# Patient Record
Sex: FEMALE | Race: WHITE | NOT HISPANIC OR LATINO | URBAN - METROPOLITAN AREA
[De-identification: names, ages, dates, MRNs, and addresses within clinical notes are randomized per-mention and may not be internally consistent; named-entity substitution may affect disease eponyms.]

---

## 2017-04-24 ENCOUNTER — TRANSFERRED RECORDS (OUTPATIENT)
Dept: HEALTH INFORMATION MANAGEMENT | Facility: CLINIC | Age: 57
End: 2017-04-24

## 2017-04-26 ENCOUNTER — OFFICE VISIT (OUTPATIENT)
Dept: FAMILY MEDICINE | Facility: CLINIC | Age: 57
End: 2017-04-26

## 2017-04-26 VITALS
DIASTOLIC BLOOD PRESSURE: 90 MMHG | SYSTOLIC BLOOD PRESSURE: 132 MMHG | HEART RATE: 67 BPM | HEIGHT: 67 IN | WEIGHT: 229 LBS | OXYGEN SATURATION: 98 % | TEMPERATURE: 98.7 F | BODY MASS INDEX: 35.94 KG/M2

## 2017-04-26 DIAGNOSIS — T78.40XD ALLERGIC REACTION, SUBSEQUENT ENCOUNTER: Primary | ICD-10-CM

## 2017-04-26 DIAGNOSIS — R03.0 ELEVATED BLOOD PRESSURE READING WITHOUT DIAGNOSIS OF HYPERTENSION: ICD-10-CM

## 2017-04-26 DIAGNOSIS — R06.02 SOB (SHORTNESS OF BREATH): ICD-10-CM

## 2017-04-26 RX ORDER — METHYLPREDNISOLONE 4 MG
TABLET, DOSE PACK ORAL
COMMUNITY
Start: 2017-04-24 | End: 2017-05-01

## 2017-04-26 RX ORDER — ALBUTEROL SULFATE 90 UG/1
2 AEROSOL, METERED RESPIRATORY (INHALATION) EVERY 6 HOURS PRN
Qty: 3 INHALER | Refills: 1 | Status: SHIPPED | OUTPATIENT
Start: 2017-04-26

## 2017-04-26 NOTE — PATIENT INSTRUCTIONS
Use albuterol as needed for shortness of breath. If not helping, please call our clinic and let us know.    Schedule allergy visit.    Return to clinic in 2 weeks for recheck of blood pressure.    You are scheduled for Allergy/Asthma referral at:  [facility]  [address] [place] [id number]  [phone]  Date:  Tuesday May 2, 2017  Time:  10:20 AM  Given to patient.  Sarah Romero  4/26/17

## 2017-04-26 NOTE — PROGRESS NOTES
"Subjective:    Jina Hu is a 56 year old female who presents for follow up on ED visit at [facility] 4/24/17 for presumed allergic reaction. She reports that for the last couple of years she has experienced periodic episodes of \"scratchy throat\" and \"cough with clearing of my throat\". She has attributed these symptoms to artificial sweeteners in the past. She would use benadryl when these symptoms would occur and it would relieve her symptoms. After cutting out artifical sweeteners, she was symptom free for quite a while until about three weeks ago when she drank some apple cider vinegar and experience similar symptoms, which were again relived with benadryl. However, she tells me she went to a baby shower on 4/23/17 and had a couple of cupcakes and fountain fruit punch and had return of the same symptoms again. She took a couple of doses of benadryl and it did not go away. She went to bed, but her symptoms woke her up at 11:30 with scratchy throat and coughing and she took another dose of benadryl but it did not go away completely so she went to the ED for evalution. She was also having some chest heaviness across her entire chest with some SOB, which was new for her, so this made her nervous.     At [place]'s ED, they did an EKG, troponin, d-dimer, CXR and BMP, all of which were unremarkable. Her BP was elevated to 192/90's, but was otherwise vitally stable. They gave her a dose a decadron and sent her home with methylprednisolone 4 mg daily X 5 days (she is day 3/5 now and tolerating well). Her symptoms resolved early on 4/24/17 and have not recurred. She says she had asthma many years ago, but it \"went away\". She denies having chest pain or SOB ever before and denies any exertional chest pressure or history of heart disease. She denies any history of high blood pressure and denies tobacco abuse. No fever, chills or productive cough.    ROS:   General: Denies fevers, chills.  Skin: Denies new rashes or " "lesions.  HEENT: Denies headache, visual disturbance.   CV: Denies chest pain, palpitations or shortness of breath.  GI: Denies N/V/D.    Objective:    /90  Pulse 67  Temp 98.7  F (37.1  C)  Ht 5' 6.75\" (169.5 cm)  Wt 229 lb (103.9 kg)  SpO2 98%  BMI 36.14 kg/m2    Physical Exam:  General: NAD.  Skin: No rashes or lesions visualized.  HEENT: PERRLA, EOMI.  Heart: Regular rhythm, no murmurs.  Lungs: Clear to auscultation b/l, no wheezes, crackles, or rales.  Abdomen: Bowel sounds X 4 quadrants, no tenderness to palpation.   MSK: Normal gait.  Lymphatic: No swelling seen.  Neurologic: CN II-XII grossly intact.     Assessment/Plan:  1. Allergic reaction: Symptoms consistent with allergic reaction, but unclear impetus. Referral for allergy testing placed. Continue medrol dose pack to completion. Call if worsening or return of symptoms.      2. Shortness of breath, chest pressure: Likely related to allergic reaction, but possible that there is a component of reactive lung disease to this, with reported history of asthma. Prescribed albuterol inhaler as needed for wheezing. This does not sound anginal in nature, but will continue to monitor. Patient advised of signs and symptoms warranting evaluation in the ED.     3. Elevated blood pressure without diagnosis of hypertension: Patient not interested in home blood pressure cuff. Will have her return in 2 weeks for recheck.      Angelita Day, PGY 2  Family Medicine Resident  [facility]             "

## 2017-04-26 NOTE — MR AVS SNAPSHOT
After Visit Summary   4/26/2017    Jina Hu    MRN: [id number]           Patient Information     Date Of Birth          1960        Visit Information        Provider Department      4/26/2017 1:30 PM Angelita Day DO [facility]        Today's Diagnoses     SOB (shortness of breath)    -  1    Allergic reaction, subsequent encounter          Care Instructions    Use albuterol as needed for shortness of breath. If not helping, please call our clinic and let us know.    Schedule allergy visit.    Return to clinic in 2 weeks for recheck of blood pressure.        Follow-ups after your visit        Additional Services     ALLERGY/ASTHMA ADULT REFERRAL       Patient will either stop [facility] desk by or call.    Reason for Referral: History of possible allergic reactions, would like to be tested for allergies.     needed: No  Language: English                  Who to contact     Please call your clinic at [phone] to:    Ask questions about your health    Make or cancel appointments    Discuss your medicines    Learn about your test results    Speak to your doctor   If you have compliments or concerns about an experience at your clinic, or if you wish to file a complaint, please contact [facility] Patient Relations at [phone] or email us at [email or web address]         Additional Information About Your Visit        MyChart Information     Mingleboxt is an electronic gateway that provides easy, online access to your medical records. With Gaopeng, you can request a clinic appointment, read your test results, renew a prescription or communicate with your care team.     To sign up for Mingleboxt visit the website at [email or web address]   You will be asked to enter the access code listed below, as well as some personal information. Please follow the directions to create your username and password.     Your access code is:  [email or web address]  Expires: 2017  2:24 PM     Your access code will  in 90 days. If you need help or a new code, please contact your [facility] or call [phone] for assistance.        Care EveryWhere ID     This is your Care EveryWhere ID. This could be used by other organizations to access your [place] medical records  [id number]        Your Vitals Were     Pulse Temperature Height Pulse Oximetry BMI (Body Mass Index)       67 98.7  F (37.1  C) 5' 6.75\" (169.5 cm) 98% 36.14 kg/m2        Blood Pressure from Last 3 Encounters:   17 132/90   06/03/15 113/76   03/11/15 108/75    Weight from Last 3 Encounters:   17 229 lb (103.9 kg)   06/03/15 226 lb (102.5 kg)   03/11/15 218 lb 4 oz (99 kg)              We Performed the Following     ALLERGY/ASTHMA ADULT REFERRAL          Today's Medication Changes          These changes are accurate as of: 17  2:24 PM.  If you have any questions, ask your nurse or doctor.               Start taking these medicines.        Dose/Directions    albuterol 108 (90 BASE) MCG/ACT Inhaler   Commonly known as:  PROAIR HFA/PROVENTIL HFA/VENTOLIN HFA   Used for:  SOB (shortness of breath)   Started by:  Angelita Day DO        Dose:  2 puff   Inhale 2 puffs into the lungs every 6 hours as needed for shortness of breath / dyspnea or wheezing   Quantity:  3 Inhaler   Refills:  1            Where to get your medicines      These medications were sent to [id number] - [place], [place] - [address] AT [facility] [address]  [address], [place] [address]     Phone:  [phone]     albuterol 108 (90 BASE) MCG/ACT Inhaler                Primary Care Provider Office Phone # Fax #    Eliza Guerita Bradshaw [phone]       [facility] [address] [place] [address]        Thank you!     Thank you for choosing [facility]  for your care. Our goal is " always to provide you with excellent care. Hearing back from our patients is one way we can continue to improve our services. Please take a few minutes to complete the written survey that you may receive in the mail after your visit with us. Thank you!             Your Updated Medication List - Protect others around you: Learn how to safely use, store and throw away your medicines at www.disposemymeds.org.          This list is accurate as of: 4/26/17  2:24 PM.  Always use your most recent med list.                   Brand Name Dispense Instructions for use    albuterol 108 (90 BASE) MCG/ACT Inhaler    PROAIR HFA/PROVENTIL HFA/VENTOLIN HFA    3 Inhaler    Inhale 2 puffs into the lungs every 6 hours as needed for shortness of breath / dyspnea or wheezing       ALEVE PO      Take as needed       methylPREDNISolone 4 MG tablet    MEDROL DOSEPAK     follow package directions       TYLENOL PO      Per pt infoTake as needed

## 2017-05-02 ENCOUNTER — OFFICE VISIT - HEALTHEAST (OUTPATIENT)
Dept: ALLERGY | Facility: CLINIC | Age: 57
End: 2017-05-02

## 2017-05-02 DIAGNOSIS — K21.9 GASTROESOPHAGEAL REFLUX DISEASE, ESOPHAGITIS PRESENCE NOT SPECIFIED: ICD-10-CM

## 2018-01-29 ENCOUNTER — TELEPHONE (OUTPATIENT)
Dept: FAMILY MEDICINE | Facility: CLINIC | Age: 58
End: 2018-01-29

## 2018-01-29 NOTE — TELEPHONE ENCOUNTER
May 2015.. Twitch on right eye and blew it off as stress related and have been dealing with it but on Sunday she started having pain in her eye and states that the pain feel as though it is behind her eye. States the pain is consistent and remains the same. She denies any vision changes since it started but she is concerned. Patient was advised to make an appointment in clinic or with her eye doctor but she should get to see someone ASAP. Patient verbalized understanding and will check with the eye clinic to see if they have any available appointments. Patient symptoms were discussed in clinic with Dr. Maher and she agrees the patient needs to get in to be seen. /JM Law  Routed to Dr. Maher

## 2018-01-29 NOTE — TELEPHONE ENCOUNTER
[facility] phone call message-patient reporting a symptom:     Symptom:     Right eye pain    Same Day Visit Offered: n/a    Additional comments:    Please advise.    OK to leave message on voice mail? Yes    Primary language: English      needed? No    Call taken on January 29, 2018 at 11:52 AM by Sarah Romero

## 2018-02-15 ENCOUNTER — TRANSFERRED RECORDS (OUTPATIENT)
Dept: HEALTH INFORMATION MANAGEMENT | Facility: CLINIC | Age: 58
End: 2018-02-15

## 2018-02-22 ENCOUNTER — HOSPITAL ENCOUNTER (OUTPATIENT)
Dept: MAMMOGRAPHY | Facility: CLINIC | Age: 58
Discharge: HOME | End: 2018-02-22
Attending: FAMILY MEDICINE

## 2018-02-22 ENCOUNTER — COMMUNICATION - HEALTHEAST (OUTPATIENT)
Dept: TELEHEALTH | Facility: CLINIC | Age: 58
End: 2018-02-22

## 2018-02-22 DIAGNOSIS — Z12.31 VISIT FOR SCREENING MAMMOGRAM: ICD-10-CM

## 2018-03-01 DIAGNOSIS — Z12.31 VISIT FOR SCREENING MAMMOGRAM: Primary | ICD-10-CM

## 2018-03-01 LAB — MAMMOGRAM: NORMAL

## 2018-03-31 ENCOUNTER — HEALTH MAINTENANCE LETTER (OUTPATIENT)
Age: 58
End: 2018-03-31

## 2018-10-11 ENCOUNTER — OFFICE VISIT (OUTPATIENT)
Dept: FAMILY MEDICINE | Facility: CLINIC | Age: 58
End: 2018-10-11
Payer: COMMERCIAL

## 2018-10-11 ENCOUNTER — AMBULATORY - HEALTHEAST (OUTPATIENT)
Dept: ADMINISTRATIVE | Facility: REHABILITATION | Age: 58
End: 2018-10-11

## 2018-10-11 VITALS
RESPIRATION RATE: 16 BRPM | DIASTOLIC BLOOD PRESSURE: 82 MMHG | BODY MASS INDEX: 39.23 KG/M2 | TEMPERATURE: 97.9 F | HEART RATE: 88 BPM | SYSTOLIC BLOOD PRESSURE: 117 MMHG | WEIGHT: 248.6 LBS | OXYGEN SATURATION: 96 %

## 2018-10-11 DIAGNOSIS — M25.50 MULTIPLE JOINT PAIN: ICD-10-CM

## 2018-10-11 DIAGNOSIS — M19.90 ARTHRITIS: ICD-10-CM

## 2018-10-11 DIAGNOSIS — Z51.81 ENCOUNTER FOR MONITORING CHRONIC NSAID THERAPY: ICD-10-CM

## 2018-10-11 DIAGNOSIS — Z79.1 ENCOUNTER FOR MONITORING CHRONIC NSAID THERAPY: ICD-10-CM

## 2018-10-11 DIAGNOSIS — M19.90 ARTHRITIS: Primary | ICD-10-CM

## 2018-10-11 LAB
BUN SERPL-MCNC: 15.5 MG/DL (ref 7–19)
CALCIUM SERPL-MCNC: 9 MG/DL (ref 8.5–10.1)
CHLORIDE SERPLBLD-SCNC: 103.4 MMOL/L (ref 98–110)
CO2 SERPL-SCNC: 25 MMOL/L (ref 20–32)
CREAT SERPL-MCNC: 0.8 MG/DL (ref 0.5–1)
ERYTHROCYTE [SEDIMENTATION RATE] IN BLOOD: 58 MM/HR (ref 0–20)
GFR SERPL CREATININE-BSD FRML MDRD: 78 ML/MIN/1.7 M2
GLUCOSE SERPL-MCNC: 120.6 MG'DL (ref 70–99)
POTASSIUM SERPL-SCNC: 4 MMOL/DL (ref 3.2–4.6)
SODIUM SERPL-SCNC: 134.9 MMOL/L (ref 132–142)

## 2018-10-11 RX ORDER — ACETAMINOPHEN 500 MG
1000 TABLET ORAL EVERY 8 HOURS PRN
Qty: 90 TABLET | Refills: 1 | Status: SHIPPED | OUTPATIENT
Start: 2018-10-11

## 2018-10-11 RX ORDER — NAPROXEN 500 MG/1
500 TABLET ORAL 2 TIMES DAILY PRN
Qty: 90 TABLET | Refills: 1 | Status: SHIPPED | OUTPATIENT
Start: 2018-10-11

## 2018-10-11 NOTE — MR AVS SNAPSHOT
After Visit Summary   10/11/2018    Jina Hu    MRN: [id number]           Patient Information     Date Of Birth          1960        Visit Information        Provider Department      10/11/2018 8:40 AM Eliza Bradshaw MD [facility]        Today's Diagnoses     Arthritis    -  1    Encounter for monitoring chronic NSAID therapy        Multiple joint pain          Care Instructions    10/11/2018  Jina Hu    It was a pleasure to see you today at [facility].     My recommendations after this visit include:   1. Aleve 500 mg twice daily  2. Tylenol 1000 mg every 8 hours as needed for pain  3. Follow up with Orthopedic surgeon for hip pain  4. Labs for monitoring kidney function and inflammation  5. PT; please schedule    Thank you for allowing me to be a part of your health care team!    Sincerely,   Dr. Bradshaw            Follow-ups after your visit        Additional Services     PHYSICAL THERAPY REFERRAL       PT/OT REFERRAL  Jina Hu  1960  Phone #: [phone] (home)    needed: No  Language: English    PT/OT  Facility:   [facility], P: [phone], F: [phone]    History: hip, knee, wrist and hand pain.     Precautions/Contraindications: None    Imaging Studies: None    Surgical Procedure/Test Results: None    Treatment Goals:   Increase: Flexibility, Strength and ROM  Decrease: Pain    Prognosis: good    Visits: Up to 12    Evaluate: Evaluate and treat    Plan: Manual Therapy, Flexibility Exercise and Strength Exercise    Use of Ionto/Phonophoresis Approved - patient requires a separate medication prescription. Dexamethasone 4 mg/ml injectable - 10cc                  Future tests that were ordered for you today     Open Future Orders        Priority Expected Expires Ordered    PHYSICAL THERAPY REFERRAL Routine  10/11/2019 10/11/2018            Who to contact     Please call your clinic at [phone] to:    Ask  questions about your health    Make or cancel appointments    Discuss your medicines    Learn about your test results    Speak to your doctor            Additional Information About Your Visit        SplashMapsharLending a Helping Hand Information     ChatStat is an electronic gateway that provides easy, online access to your medical records. With ChatStat, you can request a clinic appointment, read your test results, renew a prescription or communicate with your care team.     To sign up for ChatStat visit the website at [email or web address]   You will be asked to enter the access code listed below, as well as some personal information. Please follow the directions to create your username and password.     Your access code is: [email or web address]  Expires: 2019  9:18 AM     Your access code will  in 90 days. If you need help or a new code, please contact your [facility] or call [phone] for assistance.        Care EveryWhere ID     This is your Care EveryWhere ID. This could be used by other organizations to access your [place] medical records  [id number]        Your Vitals Were     Pulse Temperature Respirations Pulse Oximetry BMI (Body Mass Index)       88 97.9  F (36.6  C) (Oral) 16 96% 39.23 kg/m2        Blood Pressure from Last 3 Encounters:   10/11/18 117/82   17 132/90   06/03/15 113/76    Weight from Last 3 Encounters:   10/11/18 248 lb 9.6 oz (112.8 kg)   17 229 lb (103.9 kg)   06/03/15 226 lb (102.5 kg)              We Performed the Following     Basic Metabolic Panel ([place])     Erythrocyte Sed Rate ([facility])          Today's Medication Changes          These changes are accurate as of 10/11/18  9:18 AM.  If you have any questions, ask your nurse or doctor.               Start taking these medicines.        Dose/Directions    naproxen 500 MG tablet   Commonly known as:  NAPROSYN   Used for:  Arthritis   Started by:  Eliza Bradshaw MD        Dose:  500 mg   Take 1  tablet (500 mg) by mouth 2 times daily as needed for moderate pain   Quantity:  90 tablet   Refills:  1         These medicines have changed or have updated prescriptions.        Dose/Directions    * TYLENOL PO   This may have changed:  Another medication with the same name was added. Make sure you understand how and when to take each.   Changed by:  Eliza Bradshaw MD        Per pt infoTake as needed   Refills:  0       * acetaminophen 500 MG tablet   Commonly known as:  TYLENOL   This may have changed:  You were already taking a medication with the same name, and this prescription was added. Make sure you understand how and when to take each.   Used for:  Arthritis   Changed by:  Eliza Bradshaw MD        Dose:  1000 mg   Take 2 tablets (1,000 mg) by mouth every 8 hours as needed for mild pain   Quantity:  90 tablet   Refills:  1       * Notice:  This list has 2 medication(s) that are the same as other medications prescribed for you. Read the directions carefully, and ask your doctor or other care provider to review them with you.         Where to get your medicines      These medications were sent to [id number] - [place], [place] - [address] AT [facility] [address]  [facility] [address], [place] [address]     Phone:  [phone]     acetaminophen 500 MG tablet    naproxen 500 MG tablet                Primary Care Provider Office Phone # Fax #    Eliza Bradshaw [phone]       [facility] [address] [place] [address]        Equal Access to Services     HEMA LEE AH: Hadii aad ku hadasho Soomaali, waaxda luqadaha, qaybta kaalmada adeegyada, marvin muir haykoki yeboah. So [facility] [phone].    ATENCIÓN: Si habla español, tiene a hammonds disposición servicios gratuitos de asistencia lingüística. Llame al [phone].    We comply with applicable federal civil rights laws and [place] laws. We do not  discriminate on the basis of race, color, national origin, age, disability, sex, sexual orientation, or gender identity.            Thank you!     Thank you for choosing [facility]  for your care. Our goal is always to provide you with excellent care. Hearing back from our patients is one way we can continue to improve our services. Please take a few minutes to complete the written survey that you may receive in the mail after your visit with us. Thank you!             Your Updated Medication List - Protect others around you: Learn how to safely use, store and throw away your medicines at www.disposemymeds.org.          This list is accurate as of 10/11/18  9:18 AM.  Always use your most recent med list.                   Brand Name Dispense Instructions for use Diagnosis    albuterol 108 (90 Base) MCG/ACT inhaler    PROAIR HFA/PROVENTIL HFA/VENTOLIN HFA    3 Inhaler    Inhale 2 puffs into the lungs every 6 hours as needed for shortness of breath / dyspnea or wheezing    SOB (shortness of breath)       ALEVE PO      Take as needed        naproxen 500 MG tablet    NAPROSYN    90 tablet    Take 1 tablet (500 mg) by mouth 2 times daily as needed for moderate pain    Arthritis       * TYLENOL PO      Per pt infoTake as needed        * acetaminophen 500 MG tablet    TYLENOL    90 tablet    Take 2 tablets (1,000 mg) by mouth every 8 hours as needed for mild pain    Arthritis       * Notice:  This list has 2 medication(s) that are the same as other medications prescribed for you. Read the directions carefully, and ask your doctor or other care provider to review them with you.
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
## 2018-10-11 NOTE — PATIENT INSTRUCTIONS
10/11/2018  Jina Hu    It was a pleasure to see you today at [facility].     My recommendations after this visit include:   1. Aleve 500 mg twice daily  2. Tylenol 1000 mg every 8 hours as needed for pain  3. Follow up with Orthopedic surgeon for hip pain  4. Labs for monitoring kidney function and inflammation  5. PT; please schedule    Thank you for allowing me to be a part of your health care team!    Sincerely,   Dr. Bradshaw    PHYSICAL THERAPY REFERRAL   October 11, 2018 at 9:33 am Demographics and referral for Physical Therapy faxed to [facility] at [phone] who will contact patient to schedule.  [facility]  Phone: [phone]  Fax: [phone]  Scheduling Hours: Monday - Friday, 7 am to 4:30 pm  [facility]

## 2018-10-11 NOTE — PROGRESS NOTES
Preceptor Attestation:   Patient seen, evaluated and discussed with the resident. I have verified the content of the note, which accurately reflects my assessment of the patient and the plan of care.   Supervising Physician:  Nakul Bray MD

## 2018-10-12 ENCOUNTER — TELEPHONE (OUTPATIENT)
Dept: FAMILY MEDICINE | Facility: CLINIC | Age: 58
End: 2018-10-12

## 2018-10-12 NOTE — TELEPHONE ENCOUNTER
[facility] phone call message- patient requesting results:    Test: Lab    Date of test: 10/11/2018    Additional Comments: the Pt called to ask about her results and would like a call back     OK to leave a message on voice mail? Yes    Primary language: English      needed? No    Call taken on October 12, 2018 at 2:34 PM by Elliot Godinez

## 2018-10-12 NOTE — PROGRESS NOTES
Attempted to call patient to discuss results. Left message on voicemail to schedule appointment to discuss results or call in to clinic to discuss. Elevate sed rate could be associated with inflammation which is causing multiple joint pains. Due to this I would like patient to come in to discuss further blood work and discuss what this may mean.   Eliza Bradshaw, DO  PGY3

## 2018-10-12 NOTE — TELEPHONE ENCOUNTER
Gave the following msg to the pt, then transferred call to appt line.    Notes Recorded by Eliza Bradshaw MD on 10/12/2018 at 2:31 PM  Attempted to call patient to discuss results. Left message on voicemail to schedule appointment to discuss results or call in to clinic to discuss. Elevate sed rate could be associated with inflammation which is causing multiple joint pains. Due to this I would like patient to come in to discuss further blood work and discuss what this may mean.   Eliza Bradshaw, DO  PGY3    /MJM Pak

## 2018-10-22 ENCOUNTER — OFFICE VISIT (OUTPATIENT)
Dept: FAMILY MEDICINE | Facility: CLINIC | Age: 58
End: 2018-10-22
Payer: COMMERCIAL

## 2018-10-22 VITALS
WEIGHT: 248 LBS | HEART RATE: 98 BPM | OXYGEN SATURATION: 95 % | DIASTOLIC BLOOD PRESSURE: 86 MMHG | RESPIRATION RATE: 20 BRPM | SYSTOLIC BLOOD PRESSURE: 120 MMHG | TEMPERATURE: 98.3 F | BODY MASS INDEX: 39.13 KG/M2

## 2018-10-22 DIAGNOSIS — R70.0 ELEVATED SED RATE: Primary | ICD-10-CM

## 2018-10-22 DIAGNOSIS — R73.9 HYPERGLYCEMIA: ICD-10-CM

## 2018-10-22 LAB
HBA1C MFR BLD: 5.9 % (ref 4.1–5.7)
RHEUMATOID FACT SERPL-ACNC: 113.3 IU/ML (ref 0–30)

## 2018-10-22 NOTE — PROGRESS NOTES
S: Jina Hu is a 57 year old female with a PMH of   Patient Active Problem List   Diagnosis     Hyperlipidemia     Personal history of tobacco use, presenting hazards to health     Ovarian cyst     Obesity     presenting to clinic today with a chief complaint of follow up for labs. She started CBD oil and this has been helpful for her pain. She reports improvement in pain in 15 minutes of starting the CBD oil. She is taking it once per day currently.   Patient believes this is RA based off experience with RA in others and family members. She states pain is 3/10. Usually would be 10/10.     ROS:  General: No fevers, chills  Head: No headache  Ears: No acute change in hearing.    CV: No chest pain or palpitations.  Resp: No shortness of breath.  No cough. No hemoptysis.  GI: No nausea, vomiting, constipation, diarrhea  : No urinary pains    Current Outpatient Prescriptions   Medication Sig Dispense Refill     Acetaminophen (TYLENOL PO) Per pt infoTake as needed       acetaminophen (TYLENOL) 500 MG tablet Take 2 tablets (1,000 mg) by mouth every 8 hours as needed for mild pain 90 tablet 1     Garlic 1000 MG CAPS        Lactobacillus (PROBIOTIC ACIDOPHILUS PO)        naproxen (NAPROSYN) 500 MG tablet Take 1 tablet (500 mg) by mouth 2 times daily as needed for moderate pain 90 tablet 1     Naproxen Sodium (ALEVE PO) Take as needed       UNABLE TO FIND MEDICATION NAME: CBD Oil       albuterol (PROAIR HFA/PROVENTIL HFA/VENTOLIN HFA) 108 (90 BASE) MCG/ACT Inhaler Inhale 2 puffs into the lungs every 6 hours as needed for shortness of breath / dyspnea or wheezing (Patient not taking: Reported on 10/11/2018) 3 Inhaler 1       O: /86  Pulse 98  Temp 98.3  F (36.8  C) (Oral)  Resp 20  Wt 248 lb (112.5 kg)  SpO2 95%  BMI 39.13 kg/m2   Gen:  Well nourished and in No acute distress   CV:  RRR  - no murmurs noted   Pulm:  CTAB, no wheezes or crackles noted, good air entry   Extrem: no cyanosis, edema or  clubbing. No joint deformity. Left middle digit stuck in flexion and straightens with manual manipulation from the patient.   MS: equal upper and lower extremity strength. Decreased hand  bilaterally but symmetric.   Psych: Flat affect     Assessment and Plan:  Jina was seen today for results.    Diagnoses and all orders for this visit:    Elevated sed rate  -     CCP Antibodies ([facility])  -     Rheumatoid Factor Quant ([facility])  -     XR Hand Bilateral G/E 3 Views  -     XR FOOT BILATERAL G/E 3 VW  Due to patient's concerns further evaluation for Rheumatoid arthritis will be pursued especially with positive ESR. Will pursue hand and foot Xrays and further RA blood work as above. Patient is up to date on breast cancer screen and is due for colon cancer screening but does not want to have this done at this time.  Patient is happy with her pain control on CBD which she recently started herself on. Discussed that she could have positive UDS due to this substance she is ingesting. Patient understands this. Patient to return to discuss labs.     Hyperglycemia  -     Hemoglobin A1c ([facility])        This patient was seen and discussed with Dr. louis who agrees with the assessment and plan.     Eliza Bradshaw, DO  PGY3

## 2018-10-22 NOTE — PROGRESS NOTES
Preceptor Attestation:   Patient seen, evaluated and discussed with the resident. I have verified the content of the note, which accurately reflects my assessment of the patient and the plan of care.   Supervising Physician:  Juan Dahl MD

## 2018-10-22 NOTE — LETTER
October 31, 2018      Jina Hu  [address] [address]        Dear Jina Hu,     The results from your blood work showed elevation in a marker that can be associated with rheumatoid arthritis. The Xrays of the hands and feet however were normal. I have put in a referral for you to see rheumatology. Also your blood sugar was elevated demonstrating you are pre-diabetic. We can manage this with diet and exercise or medications or both. If you would like you can make an appointment to discuss this further or to start medications. I would like you to come back in 1 month to discuss this regardless and to follow up on the pain.     Thank you for allowing me to be a part of your health care!   Please see below for your test results.    Resulted Orders   CCP Antibodies ([facility])   Result Value Ref Range    CCP Antibodies 1.8 <=4.9 U/mL    Narrative    Test performed by:  [facility]  [address] [place] [address]   [facility]   Result Value Ref Range    RA,Quantitative 113.3 (H) 0 - 30 IU/mL    Narrative    Test performed by:  [facility] [address] [place] [address]   Hemoglobin A1c ([facility])   Result Value Ref Range    Hemoglobin A1C 5.9 (H) 4.1 - 5.7 %       If you have any questions, please call the clinic to make an appointment.    Sincerely,    Eliza Bradshaw MD

## 2018-10-22 NOTE — MR AVS SNAPSHOT
After Visit Summary   10/22/2018    Jina Hu    MRN: [id number]           Patient Information     Date Of Birth          1960        Visit Information        Provider Department      10/22/2018 10:20 AM Eliza Bradshaw MD [facility]        Today's Diagnoses     Elevated sed rate    -  1    Hyperglycemia           Follow-ups after your visit        Follow-up notes from your care team     Return in about 4 weeks (around 2018).      Future tests that were ordered for you today     Open Future Orders        Priority Expected Expires Ordered    RHEUMATOLOGY REFERRAL Routine  10/29/2019 10/29/2018            Who to contact     Please call your clinic at [phone] to:    Ask questions about your health    Make or cancel appointments    Discuss your medicines    Learn about your test results    Speak to your doctor            Additional Information About Your Visit        MyChart Information     [employer] is an electronic gateway that provides easy, online access to your medical records. With [employer], you can request a clinic appointment, read your test results, renew a prescription or communicate with your care team.     To sign up for [employer] visit the website at [email or web address]   You will be asked to enter the access code listed below, as well as some personal information. Please follow the directions to create your username and password.     Your access code is: [email or web address]  Expires: 2019  1:13 PM     Your access code will  in 90 days. If you need help or a new code, please contact your [facility] or call [phone] for assistance.        Care EveryWhere ID     This is your Care EveryWhere ID. This could be used by other organizations to access your [place] medical records  [id number]        Your Vitals Were     Pulse Temperature Respirations Pulse Oximetry BMI (Body Mass Index)       98 98.3  F (36.8  C) (Oral) 20  95% 39.13 kg/m2        Blood Pressure from Last 3 Encounters:   10/22/18 120/86   10/11/18 117/82   04/26/17 132/90    Weight from Last 3 Encounters:   10/22/18 248 lb (112.5 kg)   10/11/18 248 lb 9.6 oz (112.8 kg)   04/26/17 229 lb (103.9 kg)              We Performed the Following     CCP Antibodies ([facility])     Hemoglobin A1c ([facility])     Rheumatoid Factor Quant ([facility])     XR FOOT BILATERAL G/E 3 VW     XR Hand Bilateral G/E 3 Views        Primary Care Provider Office Phone # Fax #    Eliza Guerita Bradshaw [phone]       [facility] MEDICINE [address] [place] [address]        Equal Access to Services     HEMA LEE : Rosa rhodeso Sojacobo, waaxda luqadaha, qaybta kaalmada adeisaiahyaefraín, marvin yeboah. So [facility] [phone].    ATENCIÓN: Si habla español, tiene a hammonds disposición servicios gratuitos de asistencia lingüística. Llame al [phone].    We comply with applicable federal civil rights laws and [place] laws. We do not discriminate on the basis of race, color, national origin, age, disability, sex, sexual orientation, or gender identity.            Thank you!     Thank you for choosing [facility]  for your care. Our goal is always to provide you with excellent care. Hearing back from our patients is one way we can continue to improve our services. Please take a few minutes to complete the written survey that you may receive in the mail after your visit with us. Thank you!             Your Updated Medication List - Protect others around you: Learn how to safely use, store and throw away your medicines at www.disposemymeds.org.          This list is accurate as of 10/22/18 11:59 PM.  Always use your most recent med list.                   Brand Name Dispense Instructions for use Diagnosis    albuterol 108 (90 Base) MCG/ACT inhaler    PROAIR HFA/PROVENTIL HFA/VENTOLIN HFA    3 Inhaler    Inhale 2 puffs into the lungs every 6 hours  as needed for shortness of breath / dyspnea or wheezing    SOB (shortness of breath)       ALEVE PO      Take as needed        Garlic 1000 MG Caps           naproxen 500 MG tablet    NAPROSYN    90 tablet    Take 1 tablet (500 mg) by mouth 2 times daily as needed for moderate pain    Arthritis       PROBIOTIC ACIDOPHILUS PO           * TYLENOL PO      Per pt infoTake as needed        * acetaminophen 500 MG tablet    TYLENOL    90 tablet    Take 2 tablets (1,000 mg) by mouth every 8 hours as needed for mild pain    Arthritis       UNABLE TO FIND      MEDICATION NAME: CBD Oil        * Notice:  This list has 2 medication(s) that are the same as other medications prescribed for you. Read the directions carefully, and ask your doctor or other care provider to review them with you.

## 2018-10-23 LAB — CCP ANTIBODIES: 1.8 U/ML

## 2018-10-23 ASSESSMENT — ASTHMA QUESTIONNAIRES: ACT_TOTALSCORE: 25

## 2018-10-29 DIAGNOSIS — R70.0 ELEVATED ERYTHROCYTE SEDIMENTATION RATE: ICD-10-CM

## 2018-10-29 DIAGNOSIS — R76.8 ELEVATED RHEUMATOID FACTOR: Primary | ICD-10-CM

## 2018-10-29 NOTE — PROGRESS NOTES
RHEUMATOLOGY REFERRAL  October 29, 2018 at 1:17 pm called and spoke with Jina - she wants to call a Provider her ex- use to see. States she will call back if she needs any help with her referral. Also discussed MyChart - patient would like to be set up with this and also asking for a copy of her AVS from her previous visit. Regenerated code for MyChart and printed out AVE from 10/22/18 (which includes her activation code for MyChart) and mailed to her home. No additional questions or concerns at this time. jemima cma

## 2018-10-29 NOTE — PROGRESS NOTES
Attempted to call patient. Left message regarding referral to rheumatology for possible rheumatoid arthritis. Advised patient this may take some time to be seen and is she would like she can come back to clinic to discuss if she has questions or concerns.     Eliza Bradshaw, DO  PGY3

## 2018-10-29 NOTE — PROGRESS NOTES
{Please send letter to patient with lab results.    Dear Jina Hu,     The results from your blood work showed elevation in a marker that can be associated with rheumatoid arthritis. The Xrays of the hands and feet however were normal. I have put in a referral for you to see rheumatology. Also your blood sugar was elevated demonstrating you are pre-diabetic. We can manage this with diet and exercise or medications or both. If you would like you can make an appointment to discuss this further or to start medications. I would like you to come back in 1 month to discuss this regardless and to follow up on the pain.    Thank you for allowing me to be a part of your health care!    Sincerely,   Dr. Bradshaw  10/29/2018

## 2021-05-24 ENCOUNTER — RECORDS - HEALTHEAST (OUTPATIENT)
Dept: ADMINISTRATIVE | Facility: CLINIC | Age: 61
End: 2021-05-24

## 2021-05-25 ENCOUNTER — RECORDS - HEALTHEAST (OUTPATIENT)
Dept: ADMINISTRATIVE | Facility: CLINIC | Age: 61
End: 2021-05-25

## 2021-05-26 ENCOUNTER — RECORDS - HEALTHEAST (OUTPATIENT)
Dept: ADMINISTRATIVE | Facility: CLINIC | Age: 61
End: 2021-05-26

## 2021-05-27 ENCOUNTER — RECORDS - HEALTHEAST (OUTPATIENT)
Dept: ADMINISTRATIVE | Facility: CLINIC | Age: 61
End: 2021-05-27

## 2021-05-28 ENCOUNTER — RECORDS - HEALTHEAST (OUTPATIENT)
Dept: ADMINISTRATIVE | Facility: CLINIC | Age: 61
End: 2021-05-28

## 2021-05-29 ENCOUNTER — RECORDS - HEALTHEAST (OUTPATIENT)
Dept: ADMINISTRATIVE | Facility: CLINIC | Age: 61
End: 2021-05-29

## 2021-05-30 ENCOUNTER — RECORDS - HEALTHEAST (OUTPATIENT)
Dept: ADMINISTRATIVE | Facility: CLINIC | Age: 61
End: 2021-05-30

## 2021-05-30 VITALS — WEIGHT: 233.6 LBS | BODY MASS INDEX: 36.86 KG/M2

## 2021-06-10 NOTE — PROGRESS NOTES
"Assessment:    Recurrent symptoms of throat irritation and cough most consistent with gastroesophageal reflux and laryngal pharyngeal reflux.  Symptoms and specific foods not typical of classic IgE mediated food allergy.      Plan:    If this becomes recurrent, recommend daily antacid.  If this does not relieve symptoms recommend visualization with upper endoscopy.  Reviewed typical symptoms of IgE mediated food allergy.  If there is any significant change and concern for classic food allergy she should return to allergy clinic.  Specific food allergy testing is not indicated at this time.  There are no standardized tests for artificial sweeteners and these are not typical food allergens.  ____________________________________________________________________________       Jina is here for concern of possible food allergy.  This first started 2 years ago.  At that time had a crystal light brand drink.  Within an hour she was having scratchy throat symptoms and cough.  She has since avoided this drink.  About 1 month ago she had been drinking apple cider vinegar.  4 days after starting this regimen she started again having throat irritation and cough.  She did take Benadryl with this.  Third episode was April 24.  She recalls being at a baby shower.  There she ate to frosted cupcakes.  She also had a \"walking taco\".   She also drank fruit punch.  After that, she describes a heavy weight on her chest, shortness of breath.  Some coughing and throat irritation.  She took Benadryl and went to bed.  The next day she was still having persistent symptoms and went to the emergency room.  There, she had a cardiac workup including EKG cardiac enzymes, d-dimer.  Chest x-ray was within normal limits.  She was given Decadron in the ER and a Medrol Dosepak which she has taken.  Patient is suspicious about artificial sweeteners as a possible trigger.  She does have a history of heartburn approximately 2-3 times per week but none " associated with this specific episodes.  No history of dysphagia.  No history of seasonal allergies.  With these episodes she does not have urticaria, angioedema, vomiting or diarrhea.  No audible wheezing.    Review of symptoms:  As above, otherwise negative    Past medical history: No other chronic medical conditions noted.    Allergies: No known allergies to medications, latex, foods or hymenoptera venom    Family history: Grandson with environmental allergies and on allergy shots.    Social history: Currently has lived in the same house since 1997.  He has forced air heat and central air conditioning.  There is a basement present.  There is a dog in the home.  She is not currently smoking cigarettes.    Medications: reviewed in chart    Physical Exam:  General:  Alert and oriented.  Eyes:  Sclera clear.  Ears: TMs translucent grey with bony landmarks visible. Nose: Pale, boggy mucosal membranes.  Throat: Pink, moist.  No lesions.  Neck: Supple.  No lymphadenopathy.  Lungs: CTA.  CV: Regular rate and rhythm. Extremities: Well perfused.  No clubbing or cyanosis. Skin: No rash    This transcription uses voice recognition software, which may contain typographical errors.

## 2021-07-13 ENCOUNTER — RECORDS - HEALTHEAST (OUTPATIENT)
Dept: ADMINISTRATIVE | Facility: CLINIC | Age: 61
End: 2021-07-13

## 2021-07-21 ENCOUNTER — RECORDS - HEALTHEAST (OUTPATIENT)
Dept: ADMINISTRATIVE | Facility: CLINIC | Age: 61
End: 2021-07-21

## 2021-07-25 ENCOUNTER — HEALTH MAINTENANCE LETTER (OUTPATIENT)
Age: 61
End: 2021-07-25

## 2021-09-19 ENCOUNTER — HEALTH MAINTENANCE LETTER (OUTPATIENT)
Age: 61
End: 2021-09-19

## 2021-12-10 ENCOUNTER — OFFICE VISIT (OUTPATIENT)
Dept: URBAN - METROPOLITAN AREA CLINIC 43 | Facility: CLINIC | Age: 61
End: 2021-12-10
Payer: COMMERCIAL

## 2021-12-10 DIAGNOSIS — E11.9 TYPE 2 DIABETES MELLITUS W/O COMPLICATION: Primary | ICD-10-CM

## 2021-12-10 DIAGNOSIS — H04.123 DRY EYE SYNDROME OF BILATERAL LACRIMAL GLANDS: ICD-10-CM

## 2021-12-10 DIAGNOSIS — Z79.84 LONG TERM (CURRENT) USE OF ORAL HYPOGLYCEMIC DRUGS: ICD-10-CM

## 2021-12-10 DIAGNOSIS — H25.13 AGE-RELATED NUCLEAR CATARACT, BILATERAL: ICD-10-CM

## 2021-12-10 PROCEDURE — 92004 COMPRE OPH EXAM NEW PT 1/>: CPT | Performed by: OPTOMETRIST

## 2021-12-10 RX ORDER — DEXTRAN 70, GLYCERIN, HYPROMELLOSE 1; 2; 3 MG/ML; MG/ML; MG/ML
SOLUTION/ DROPS OPHTHALMIC
Qty: 15 | Refills: 11 | Status: ACTIVE
Start: 2021-12-10

## 2021-12-10 RX ORDER — POLYVINYL ALCOHOL 14 MG/ML
1.4 % SOLUTION/ DROPS OPHTHALMIC
Qty: 15 | Refills: 11 | Status: ACTIVE
Start: 2021-12-10

## 2021-12-10 ASSESSMENT — VISUAL ACUITY
OD: 20/20
OS: 20/20

## 2021-12-10 ASSESSMENT — KERATOMETRY
OS: 44.75
OD: 44.88

## 2021-12-10 ASSESSMENT — INTRAOCULAR PRESSURE
OS: 20
OD: 20

## 2021-12-10 NOTE — IMPRESSION/PLAN
Impression: Type 2 diabetes mellitus w/o complication: T45.7. Plan: Discussed with patient today's exam findings. Stressed importance of good blood glucose control.   Monitor with annual diabetic exam.

## 2022-08-21 ENCOUNTER — HEALTH MAINTENANCE LETTER (OUTPATIENT)
Age: 62
End: 2022-08-21

## 2022-11-21 ENCOUNTER — HEALTH MAINTENANCE LETTER (OUTPATIENT)
Age: 62
End: 2022-11-21

## 2023-02-15 ENCOUNTER — OFFICE VISIT (OUTPATIENT)
Dept: URBAN - METROPOLITAN AREA CLINIC 44 | Facility: CLINIC | Age: 63
End: 2023-02-15
Payer: COMMERCIAL

## 2023-02-15 DIAGNOSIS — H04.123 DRY EYE SYNDROME OF BILATERAL LACRIMAL GLANDS: ICD-10-CM

## 2023-02-15 DIAGNOSIS — E11.9 TYPE 2 DIABETES MELLITUS W/O COMPLICATION: Primary | ICD-10-CM

## 2023-02-15 DIAGNOSIS — H25.13 AGE-RELATED NUCLEAR CATARACT, BILATERAL: ICD-10-CM

## 2023-02-15 DIAGNOSIS — Z79.84 LONG TERM (CURRENT) USE OF ORAL HYPOGLYCEMIC DRUGS: ICD-10-CM

## 2023-02-15 PROCEDURE — 92014 COMPRE OPH EXAM EST PT 1/>: CPT | Performed by: OPTOMETRIST

## 2023-02-15 ASSESSMENT — KERATOMETRY
OD: 44.88
OS: 44.75

## 2023-02-15 ASSESSMENT — VISUAL ACUITY
OS: 20/20
OD: 20/20

## 2023-02-15 ASSESSMENT — INTRAOCULAR PRESSURE
OS: 13
OD: 17

## 2023-02-15 NOTE — IMPRESSION/PLAN
Impression: Type 2 diabetes mellitus w/o complication: W43.2. No vascular abnormalities observed per exam and OCT. Plan: PLAN: Stressed importance of regular follow ups with PCP. Discussed importance to maintaining A1C at 7.0 or below. Send report to PCP. RTC 12 months for complete and OCT mac.  OPTOS (If DM for >10yrs)

## 2023-02-15 NOTE — IMPRESSION/PLAN
Impression: Dry eye syndrome of bilateral lacrimal glands: H04.123. Clinical evaluation shows mild DED signs. Patient reports occasional symptoms which are not interfering with daily activities. Plan: PLAN: Recommend Lipid based tears to be used 3-4X daily if symptomatic. PRN if no symptoms. Observe condition and RTC if symptom's worsen.

## 2023-09-16 ENCOUNTER — HEALTH MAINTENANCE LETTER (OUTPATIENT)
Age: 63
End: 2023-09-16

## 2024-02-03 ENCOUNTER — HEALTH MAINTENANCE LETTER (OUTPATIENT)
Age: 64
End: 2024-02-03